# Patient Record
Sex: MALE | URBAN - METROPOLITAN AREA
[De-identification: names, ages, dates, MRNs, and addresses within clinical notes are randomized per-mention and may not be internally consistent; named-entity substitution may affect disease eponyms.]

---
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
## 2023-09-22 NOTE — PROCEDURE: STELARA MONITORING
Latest Cmp (Optional): 11/06/2019
Add High Risk Medication Management Associated Diagnosis?: No
Detail Level: Zone
PERRL/conjunctiva clear
PERRL/conjunctiva clear